# Patient Record
Sex: MALE | Race: BLACK OR AFRICAN AMERICAN | NOT HISPANIC OR LATINO | URBAN - METROPOLITAN AREA
[De-identification: names, ages, dates, MRNs, and addresses within clinical notes are randomized per-mention and may not be internally consistent; named-entity substitution may affect disease eponyms.]

---

## 2017-04-25 ENCOUNTER — EMERGENCY (EMERGENCY)
Facility: HOSPITAL | Age: 57
LOS: 0 days | Discharge: HOME | End: 2017-04-25
Attending: EMERGENCY MEDICINE
Payer: COMMERCIAL

## 2017-04-25 VITALS
RESPIRATION RATE: 18 BRPM | HEIGHT: 65 IN | SYSTOLIC BLOOD PRESSURE: 144 MMHG | HEART RATE: 117 BPM | DIASTOLIC BLOOD PRESSURE: 92 MMHG | OXYGEN SATURATION: 99 % | TEMPERATURE: 99 F | WEIGHT: 175.05 LBS

## 2017-04-25 VITALS
SYSTOLIC BLOOD PRESSURE: 127 MMHG | HEART RATE: 85 BPM | DIASTOLIC BLOOD PRESSURE: 76 MMHG | RESPIRATION RATE: 17 BRPM | OXYGEN SATURATION: 97 % | TEMPERATURE: 99 F

## 2017-04-25 DIAGNOSIS — Z91.013 ALLERGY TO SEAFOOD: ICD-10-CM

## 2017-04-25 DIAGNOSIS — R07.9 CHEST PAIN, UNSPECIFIED: ICD-10-CM

## 2017-04-25 DIAGNOSIS — I10 ESSENTIAL (PRIMARY) HYPERTENSION: ICD-10-CM

## 2017-04-25 DIAGNOSIS — R51 HEADACHE: ICD-10-CM

## 2017-04-25 LAB
ALBUMIN SERPL ELPH-MCNC: 3.8 G/DL — SIGNIFICANT CHANGE UP (ref 3.3–5)
ALP SERPL-CCNC: 96 U/L — SIGNIFICANT CHANGE UP (ref 40–120)
ALT FLD-CCNC: 51 U/L — SIGNIFICANT CHANGE UP (ref 12–78)
ANION GAP SERPL CALC-SCNC: 9 MMOL/L — SIGNIFICANT CHANGE UP (ref 5–17)
APTT BLD: 29.4 SEC — SIGNIFICANT CHANGE UP (ref 27.5–37.4)
AST SERPL-CCNC: 51 U/L — HIGH (ref 15–37)
BASOPHILS # BLD AUTO: 0.1 K/UL — SIGNIFICANT CHANGE UP (ref 0–0.2)
BASOPHILS NFR BLD AUTO: 2.1 % — HIGH (ref 0–2)
BILIRUB SERPL-MCNC: 0.8 MG/DL — SIGNIFICANT CHANGE UP (ref 0.2–1.2)
BUN SERPL-MCNC: 15 MG/DL — SIGNIFICANT CHANGE UP (ref 7–23)
CALCIUM SERPL-MCNC: 8.9 MG/DL — SIGNIFICANT CHANGE UP (ref 8.5–10.1)
CHLORIDE SERPL-SCNC: 102 MMOL/L — SIGNIFICANT CHANGE UP (ref 96–108)
CK MB BLD-MCNC: 0.2 % — SIGNIFICANT CHANGE UP (ref 0–3.5)
CK MB BLD-MCNC: 0.4 % — SIGNIFICANT CHANGE UP (ref 0–3.5)
CK MB CFR SERPL CALC: 0.5 NG/ML — SIGNIFICANT CHANGE UP (ref 0.5–3.6)
CK MB CFR SERPL CALC: 0.6 NG/ML — SIGNIFICANT CHANGE UP (ref 0.5–3.6)
CK SERPL-CCNC: 125 U/L — SIGNIFICANT CHANGE UP (ref 26–308)
CK SERPL-CCNC: 307 U/L — SIGNIFICANT CHANGE UP (ref 26–308)
CO2 SERPL-SCNC: 28 MMOL/L — SIGNIFICANT CHANGE UP (ref 22–31)
CREAT SERPL-MCNC: 1 MG/DL — SIGNIFICANT CHANGE UP (ref 0.5–1.3)
EOSINOPHIL # BLD AUTO: 0.1 K/UL — SIGNIFICANT CHANGE UP (ref 0–0.5)
EOSINOPHIL NFR BLD AUTO: 1.2 % — SIGNIFICANT CHANGE UP (ref 0–6)
GLUCOSE SERPL-MCNC: 109 MG/DL — HIGH (ref 70–99)
HCT VFR BLD CALC: 47.8 % — SIGNIFICANT CHANGE UP (ref 39–50)
HGB BLD-MCNC: 16.8 G/DL — SIGNIFICANT CHANGE UP (ref 13–17)
INR BLD: 0.96 RATIO — SIGNIFICANT CHANGE UP (ref 0.88–1.16)
LYMPHOCYTES # BLD AUTO: 1.5 K/UL — SIGNIFICANT CHANGE UP (ref 1–3.3)
LYMPHOCYTES # BLD AUTO: 28.4 % — SIGNIFICANT CHANGE UP (ref 13–44)
MCHC RBC-ENTMCNC: 30.3 PG — SIGNIFICANT CHANGE UP (ref 27–34)
MCHC RBC-ENTMCNC: 35.2 GM/DL — SIGNIFICANT CHANGE UP (ref 32–36)
MCV RBC AUTO: 86 FL — SIGNIFICANT CHANGE UP (ref 80–100)
MONOCYTES # BLD AUTO: 0.6 K/UL — SIGNIFICANT CHANGE UP (ref 0–0.9)
MONOCYTES NFR BLD AUTO: 10.8 % — SIGNIFICANT CHANGE UP (ref 2–14)
NEUTROPHILS # BLD AUTO: 3 K/UL — SIGNIFICANT CHANGE UP (ref 1.8–7.4)
NEUTROPHILS NFR BLD AUTO: 57.4 % — SIGNIFICANT CHANGE UP (ref 43–77)
PLATELET # BLD AUTO: 188 K/UL — SIGNIFICANT CHANGE UP (ref 150–400)
POTASSIUM SERPL-MCNC: 5.2 MMOL/L — SIGNIFICANT CHANGE UP (ref 3.5–5.3)
POTASSIUM SERPL-SCNC: 5.2 MMOL/L — SIGNIFICANT CHANGE UP (ref 3.5–5.3)
PROT SERPL-MCNC: 8.3 GM/DL — SIGNIFICANT CHANGE UP (ref 6–8.3)
PROTHROM AB SERPL-ACNC: 10.5 SEC — SIGNIFICANT CHANGE UP (ref 9.8–12.7)
RBC # BLD: 5.56 M/UL — SIGNIFICANT CHANGE UP (ref 4.2–5.8)
RBC # FLD: 13.3 % — SIGNIFICANT CHANGE UP (ref 11–15)
SODIUM SERPL-SCNC: 139 MMOL/L — SIGNIFICANT CHANGE UP (ref 135–145)
TROPONIN I SERPL-MCNC: <.015 NG/ML — SIGNIFICANT CHANGE UP (ref 0.01–0.04)
TROPONIN I SERPL-MCNC: <.015 NG/ML — SIGNIFICANT CHANGE UP (ref 0.01–0.04)
WBC # BLD: 5.2 K/UL — SIGNIFICANT CHANGE UP (ref 3.8–10.5)
WBC # FLD AUTO: 5.2 K/UL — SIGNIFICANT CHANGE UP (ref 3.8–10.5)

## 2017-04-25 PROCEDURE — 70450 CT HEAD/BRAIN W/O DYE: CPT | Mod: 26

## 2017-04-25 PROCEDURE — 71010: CPT | Mod: 26

## 2017-04-25 PROCEDURE — 99285 EMERGENCY DEPT VISIT HI MDM: CPT

## 2017-04-25 NOTE — ED ADULT NURSE NOTE - OBJECTIVE STATEMENT
pt c/o head "cramping" pain, spb 190 at home. pt states took nifedipine 20mg po twice today, last dose at 1300 prior to arrival

## 2017-04-25 NOTE — ED PROVIDER NOTE - MEDICAL DECISION MAKING DETAILS
pt pending second set Henry with Dr. Paul to follow up. pt pending second set Henry with Dr. Lamas to follow up.  bruce lamas: pt was a signout pending 2nd trop which is neg. pt appears well. pt appears well and non-toxic. . VSS. Sx have improved during ED stay. No acute events during ED observation period. Precautions given to return to the ED if sx persist or change. Pt expresses understanding and has no further questions. Pt feels comfortable and wishes to be discharged home. Instructed to follow up with PMD cards in 24 hrs.

## 2017-04-25 NOTE — ED PROVIDER NOTE - CARE PLAN
Principal Discharge DX:	Acute intractable headache, unspecified headache type  Secondary Diagnosis:	Chest pain, unspecified type

## 2017-04-25 NOTE — ED PROVIDER NOTE - OBJECTIVE STATEMENT
56 year old male with PMH of HTN presenting to ED due to feeling of head heaviness, states otherwise had some chest discomfort as well earlier, felt tight. No SOB, no cough.

## 2018-12-04 ENCOUNTER — EMERGENCY (EMERGENCY)
Facility: HOSPITAL | Age: 58
LOS: 0 days | Discharge: HOME | End: 2018-12-04
Attending: EMERGENCY MEDICINE
Payer: COMMERCIAL

## 2018-12-04 VITALS
TEMPERATURE: 98 F | HEIGHT: 65 IN | HEART RATE: 63 BPM | OXYGEN SATURATION: 99 % | DIASTOLIC BLOOD PRESSURE: 81 MMHG | SYSTOLIC BLOOD PRESSURE: 160 MMHG | WEIGHT: 179.9 LBS | RESPIRATION RATE: 19 BRPM
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
## 2018-12-04 DIAGNOSIS — S22.41XA MULTIPLE FRACTURES OF RIBS, RIGHT SIDE, INITIAL ENCOUNTER FOR CLOSED FRACTURE: ICD-10-CM

## 2018-12-04 DIAGNOSIS — I10 ESSENTIAL (PRIMARY) HYPERTENSION: ICD-10-CM

## 2018-12-04 DIAGNOSIS — M54.6 PAIN IN THORACIC SPINE: ICD-10-CM

## 2018-12-04 DIAGNOSIS — Z91.013 ALLERGY TO SEAFOOD: ICD-10-CM
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
## 2018-12-04 PROCEDURE — 99284 EMERGENCY DEPT VISIT MOD MDM: CPT

## 2018-12-04 PROCEDURE — 71101 X-RAY EXAM UNILAT RIBS/CHEST: CPT | Mod: 26,RT

## 2018-12-04 RX ORDER — NIFEDIPINE 30 MG
0 TABLET, EXTENDED RELEASE 24 HR ORAL
Qty: 0 | Refills: 0 | COMMUNITY

## 2018-12-04 RX ORDER — OXYCODONE AND ACETAMINOPHEN 5; 325 MG/1; MG/1
1 TABLET ORAL ONCE
Qty: 0 | Refills: 0 | Status: DISCONTINUED | OUTPATIENT
Start: 2018-12-04 | End: 2018-12-04

## 2018-12-04 RX ADMIN — OXYCODONE AND ACETAMINOPHEN 1 TABLET(S): 5; 325 TABLET ORAL at 15:35

## 2018-12-04 NOTE — ED PROVIDER NOTE - MUSCULOSKELETAL, MLM
Spine appears normal, range of motion is not limited, right inferior posterior chest wall mild tenderness

## 2018-12-04 NOTE — ED PROVIDER NOTE - NSFOLLOWUPCLINICS_GEN_ALL_ED_FT
[facility]  Internal Medicine  [phone] [place] [address]  Phone: [phone]  Fax:   Follow Up Time:

## 2018-12-04 NOTE — ED ADULT NURSE NOTE - OBJECTIVE STATEMENT
Patient fell down steps and now complaining of back pain. Patient fell down steps and now complaining of right posterior rib pain. Denies hitting head and denies use of blood thinners.

## 2018-12-04 NOTE — ED ADULT NURSE NOTE - NS ED NURSE DC INFO COMPLEXITY
Simple: Patient demonstrates quick and easy understanding/Verbalized Understanding Verbalized Understanding/Simple: Patient demonstrates quick and easy understanding

## 2018-12-04 NOTE — ED ADULT NURSE NOTE - NSIMPLEMENTINTERV_GEN_ALL_ED
Implemented All Fall Risk Interventions:  [place] to call system. Call bell, personal items and telephone within reach. Instruct patient to call for assistance. Room bathroom lighting operational. Non-slip footwear when patient is off stretcher. Physically safe environment: no spills, clutter or unnecessary equipment. Stretcher in lowest position, wheels locked, appropriate side rails in place. Provide visual cue, wrist band, yellow gown, etc. Monitor gait and stability. Monitor for mental status changes and reorient to person, place, and time. Review medications for side effects contributing to fall risk. Reinforce activity limits and safety measures with patient and family.

## 2021-02-19 NOTE — ED ADULT NURSE NOTE - NSSISCREENINGQ3_ED_A_ED
70 y.o. OTHER male who presents for RPE    He has not gone back to the gym since the pandemic started; he is walking and biking and doing light weights at home. Sees Dr Cedrick Powers, bp controlled. He continues to see Dr J Carlos Esqueda and the abilify is controlling sx    He continues to see Dr Bossman Raman and had bx done yesterday for elev psa, results pending. He went to [facility] and no help. He tried ICI but had penile trauma, minimal help with JOVANNA. He is asking for daily Shijiebang script to go to [facility])    The neuropathy is about the same    He saw Dr German Waldron recently and he's considering [facility] Laser therapy     LAST MEDICARE WELLNESS EXAM: never as not medicare b    Past Medical History:   Diagnosis Date    Allergic rhinitis     Dr Pascale Michelle; never took immunotherapy    Anxiety     saw Dr Gurvinder Michelle; pfts show no obstruction but high RV    Basal cell carcinoma 2013    Dr. Irene Valentin s/p resection 2 spots    BPH (benign prostatic hyperplasia)     Dr. Dalton Patterson; on proscar for years    Depression     and anxiety; paxil 2008?  lexapro and wellbutrin til 2018; tried proza; saw Dr Rowe Phalen now seeing Dr Chata Watts Dyslipidemia     Elevated PSA 2020    Dr Pratibha Piper Erectile dysfunction     ICI with penile trauma; JOVANNA not helpful; [facility] unsuccessful    FHx: heart disease     GERD (gastroesophageal reflux disease)     s/p dilation 2003 Dr. Miah Landrum; egd 2015; Dr Miah Landrum egd 12/20 dilation, hiatal hernia    Heel spur     Dr Amie Merchant Hypertension 02/2019    24 hr ambulatory monitoring Dr Nathanael Franklin Hypovitaminosis D     GALILEA (obstructive sleep apnea)     intol cpap Dr Estefana Primrose 2015; using oral appliance Dr Vane Cheung; AHI 17.8 min desats 83    Overweight (BMI 25.0-29.9) 2/12/2018    Peripheral neuropathy     and B CTS on EMG Dr. Yadira Shook Pulmonary nodules 2011    bilat upper lobes; no change 2018; noted again [facility] 6/19    Scoliosis     Dr Aman Pruett 2014    Spinal stenosis 03/2014    MRI (3/14) showed scoliosis w multilevel degen findings, mod L3-4, L4-5 central stenosis, mod L5-S1 foraminal stenosis;  (10/17) severe L3-4 central stenosis; seeing Dr Krhis Eid     Past Surgical History:   Procedure Laterality Date    HX COLONOSCOPY      Dr. Zachariah Blanca mild diverticulosis 2000, 6/12    HX GI  06/2019    CT abd/pelvis showed nothing acute, small LIH, small HH, bladder divertic    HX ORTHOPAEDIC      B CT release 2007, 2009    HX ORTHOPAEDIC      DEXA t score 4.2 spine, 0.2 hip (2012)    AL CARDIAC SURG PROCEDURE UNLIST  8/09    ETT negative (8/09); SOH neg stress echo (6/19)    AL CARDIAC SURG PROCEDURE UNLIST  09/2019    echo nl lv, ef 55%, no wma or valvular disease    AL CHEST SURGERY PROCEDURE UNLISTED  07/2018    Dr Mark Crockett; FEV1 96%, 5% improvement postbd, ratio 104, , , DLCO 82     Social History     Socioeconomic History    Marital status:      Spouse name: Not on file    Number of children: 2    Years of education: Not on file    Highest education level: Not on file   Occupational History    Occupation: ret [employer]   Social Needs    Financial resource strain: Not on file    Food insecurity     Worry: Not on file     Inability: Not on file   [employer] needs     Medical: Not on file     Non-medical: Not on file   Tobacco Use    Smoking status: Former Smoker     Packs/day: 0.25     Years: 2.00     Pack years: 0.50    Smokeless tobacco: Never Used    Tobacco comment: reports occasional use as a teenager   Substance and Sexual Activity    Alcohol use:  Yes     Alcohol/week: 1.0 standard drinks     Types: 1 Glasses of wine per week     Comment: 1-2 glasses of wine a month    Drug use: No    Sexual activity: Yes     Partners: Female     Birth control/protection: None   Lifestyle    Physical activity     Days per week: Not on file     Minutes per session: Not on file    Stress: Not on file   Relationships    Social connections Talks on phone: Not on file     Gets together: Not on file     Attends Yazidism service: Not on file     Active member of club or organization: Not on file     Attends meetings of clubs or organizations: Not on file     Relationship status: Not on file    Intimate partner violence     Fear of current or ex partner: Not on file     Emotionally abused: Not on file     Physically abused: Not on file     Forced sexual activity: Not on file   Other Topics Concern    Not on file   Social History Narrative    Not on file     Family History   Problem Relation Age of Onset    Cancer Mother         leukemia    Heart Disease Father     Psychiatric Disorder Daughter         depression     Immunization History   Administered Date(s) Administered    Covid-19, MODERNA, Mrna, Lnp-s, Pf, 100mcg/0.5mL 02/10/2021    Influenza High Dose Vaccine PF 11/17/2017    Influenza Vaccine Rowl) PF (>6 Mo Flulaval, Fluarix, and >3 Yrs Afluria, Fluzone 23934) 11/02/2015, 11/11/2016    Influenza Vaccine (Tri) Adjuvanted (>65 Yrs FLUAD TRI 89087) 10/26/2018, 10/25/2019, 11/01/2020    Influenza Vaccine PF 11/05/2013, 10/30/2014    Influenza Vaccine Split 10/21/2011, 10/31/2012    Influenza Vaccine Whole 11/05/2010    Pneumococcal Conjugate (PCV-13) 01/31/2017    Pneumococcal Polysaccharide (PPSV-23) 08/07/2013, 02/18/2019    TD Vaccine 07/22/2009    Tdap 11/11/2016    Zoster 05/07/2012    Zoster Recombinant 02/19/2020, 09/04/2020     Current Outpatient Medications   Medication Sig    acetaminophen (TylenoL) 325 mg tablet Take 325 mg by mouth every four (4) hours as needed for Pain.  tadalafiL (CIALIS) 5 mg tablet Take 1 Tab by mouth daily.  sildenafil citrate (Viagra) 100 mg tablet Take 1 Tab by mouth daily as needed for Erectile Dysfunction for up to 10 doses.     Combigan 0.2-0.5 % drop ophthalmic solution INSTILL 1 DROP INTO RIGHT EYE TWICE DAILY    rosuvastatin (CRESTOR) 10 mg tablet TAKE 1 TABLET BY MOUTH EVERY NIGHT  gabapentin (NEURONTIN) 600 mg tablet TAKE 1 TABLET BY MOUTH THREE TIMES DAILY FOR NEUROPATIC PAIN. MAXIMUM DAILY AMOUNT OF 1800 MG    Syringe with Needle, Disp, (Allergy Syringe) 1 mL 27 x 1/2\" syrg FOR USE WITH INTRACAVERNOSAL INJECTIONS.  Injector Device maría DISPENSE INJECT-EASE AUTO-INJECTOR FOR USE WITH BI-MIX INJECTIONS.  fluticasone propionate (FLONASE) 50 mcg/actuation nasal spray SHAKE LIQUID AND USE 2 SPRAYS IN EACH NOSTRIL DAILY    triamterene-hydroCHLOROthiazide (DYAZIDE) 37.5-25 mg per capsule TAKE 1 CAPSULE BY MOUTH DAILY    metoprolol tartrate (LOPRESSOR) 25 mg tablet TAKE 1 TABLET BY MOUTH TWICE DAILY    ARIPiprazole (ABILIFY) 2 mg tablet Take 2 mg by mouth daily.  SYMBICORT 160-4.5 mcg/actuation HFAA INHALE 2 PUFFS BY MOUTH TWICE DAILY    ALPRAZolam (XANAX) 0.25 mg tablet Take 1 Tab by mouth daily.  VIIBRYD 40 mg tab tablet Take 1 Tab by mouth daily.  buPROPion SR (WELLBUTRIN SR) 150 mg SR tablet Take 1 Tab by mouth two (2) times a day. (Patient taking differently: Take 300 mg by mouth daily.)    latanoprost (XALATAN) 0.005 % ophthalmic solution MOAR 1 GTT INTO RIGHT EYE HS    albuterol (PROAIR HFA) 90 mcg/actuation inhaler Take 2 Puffs by inhalation every four (4) hours as needed for Wheezing.  ascorbic acid (VITAMIN C) 500 mg tablet Take  by mouth.  aspirin delayed-release 81 mg tablet Take  by mouth daily.  cholecalciferol, vitamin d3, (VITAMIN D3) 1,000 unit tablet Take  by mouth daily.  Gluc-Scar-MSM#5-C-Ofvd-Mike-Bor (OSTEO BI-FLEX) 750-625-30 mg Tab Take  by mouth daily. No current facility-administered medications for this visit.       Allergies   Allergen Reactions    Cefdinir Nausea and Vomiting    Naproxen Other (comments)     elev cr 2/19    Niacin Other (comments)     Flushing      Norvasc [Amlodipine] Swelling     REVIEW OF SYSTEMS: colo 6/12 Dr Omega Olmstead  Ophtho - no vision change or eye pain  Oral - no mouth pain, tongue or tooth problems  Ears - no hearing loss, ear pain, fullness, no swallowing problems  Cardiac - no CP, PND, orthopnea, edema, palpitations or syncope  Chest - no breast masses  Resp - no wheezing, chronic coughing, dyspnea  GI - no heartburn, nausea, vomiting, change in bowel habits, bleeding, hemorrhoids  Urinary - no dysuria, hematuria, flank pain, urgency, frequency    Visit Vitals  /72 (BP 1 Location: Left arm, BP Patient Position: Sitting, BP Cuff Size: Adult)   Pulse 62   Temp 97.3 °F (36.3 °C) (Temporal)   Resp 14   Ht 6' 3\" (1.905 m)   Wt 187 lb (84.8 kg)   SpO2 100%   BMI 23.37 kg/m²   A&O x3  Affect is appropriate. Mood stable  No apparent distress  Anicteric, no JVD, adenopathy or thyromegaly. No carotid bruits or radiated murmur  Lungs clear to auscultation, no wheezes or rales  Heart showed regular rate and rhythm. No murmur, rubs, gallops  Abdomen soft nontender, no hepatosplenomegaly or masses. Extremities without edema.   Pulses 1-2+ symmetrically    LABS  From 11/10 showed gluc 91, cr 1.10,             alt 25, chol 146, tg 74,   hdl 26, ldl-c 95, wbc 6.2, hb 14.3, plt 191, psa 0.60  From 4/12 showed                           alt 26, chol 138, tg 105, hdl 42, ldl-c 75  From 5/12 showed                                      vit d 57.9  From 7/13 showed                 alt 25, chol 135, tg 70,   hdl 44, ldl-c 77  From 7/13 showed   gluc 88, cr 1.02, gfr 79,  alt 10,                wbc 5.0, hb 14.3, plt 182, psa 0.60  From 9/14 showed   gluc 95, cr 0.99, gfr>60, alt 13, chol 138, tg 81,   hdl 41, ldl-c 81, wbc 5.6, hb 13.9, plt 187, psa 1.00  From 11/15 showed gluc 84, cr 1.01, gfr>60, alt 12, chol 148, tg 72,   hdl 44, ldl-c 90, wbc 4.8, hb 14.9, plt 193  From 1/17 showed   gluc 85, cr 1.05, gfr>60, alt 36, chol 144, tg 69,   hdl 56, ldl-c 74, wbc 4.9, hb 14.7, plt 212, psa 1.60, hba1c 5.7, vit d 44.2, hep c neg  From 2/18 showed   gluc 89, cr 1.04, gfr>60, alt 35, chol 142, tg 64,   hdl 53, ldl-c 76, wbc 4.5, hb 14.5, plt 208, psa 2.30  From 1/19 showed                           tsh 1.10,     ft4 0.80, b12 1006, fol>20, b6 nl, lorraine neg, ferritin 58  From 1/20 showed                        psa 4.60,    test 271  From 2/20 showed   gluc 85, cr 1.26, gfr 57, alt 32,  chol 158, tg 179, hdl 47, ldl-c 75, wbc 5.6, hb 15.0, plt 189  From 2/21 showed   gluc 87, cr 1.03, gfr 58, alt 27,  chol 150, tg 121, hdl 46, ldl-c 80, wbc 5.0, hb 14.7, plt 196, psa 5.16, test 329    We reviewed the patient's labs from the last several visits to point out trends in the numbers          Patient Active Problem List   Diagnosis Code    Dyslipidemia E78.5    Hypovitaminosis D E55.9    BPH (benign prostatic hyperplasia) Dr. Cruz Avery N40.0    Neuropathy Dr. Arabella Martinez G62.9    Erectile dysfunction N52.9    Diverticulosis of large intestine without hemorrhage Dr Slick Urbina K57.30    GALILEA (obstructive sleep apnea) Dr Arabella Martinez using oral appliance AHI 17.8 G47.33    Spinal stenosis, lumbar region with neurogenic claudication M48.062    Overweight (BMI 25.0-29. 9) E66.3    MDD (major depressive disorder), recurrent episode, mild (HCC) F33.0    Essential hypertension I10    Elevated PSA R97.20    GERD (gastroesophageal reflux disease) K21.9    Depression F32.9    Asthma J45.909    Anxiety F41.9     Assessment and plan:  1. Elevated psa, BPH. Per Dr Rosibel Winter  2. Dyslipidemia. Continue current regimen. 3.  Allergic rhinitis. Continue current  4. GERD. Continue current. 5.  ED.  cialis daily  6. HTN. Continue current regimen. 7.  Depression, anxiety, OCD. Continue current and f/u Dr Nolberto Umanzor. 8.  Hypovit D. Follow   9. GALILEA. Per neurology  10. Spinal stenosis. Continue current  11. shingrix advocated        RTC 2/21    Above conditions discussed at length and patient vocalized understanding.   All questions answered to patient satisfaction No

## 2021-06-20 NOTE — ED ADULT NURSE NOTE - MODE OF DISCHARGE
Letter by Estephania Espinosa NP at      Author: Estephania Espinosa NP Service: -- Author Type: --    Filed:  Encounter Date: 6/3/2020 Status: (Other)         Patient: Sonu Solomon   MR Number: [id number]   YOB: 1928   Date of Visit: 6/3/2020                 [facility]    DATE: 6/3/2020    NAME:  Sonu Solomon             :  1928  MRN: [id number]  CODE STATUS:  DNR/DNI    VISIT TYPE: Problem Visit (fall, weakness, weight loss)     FACILITY:  [facility] [[id number]]       CHIEF COMPLAIN/REASON FOR VISIT:    Chief Complaint   Patient presents with   ? Problem Visit     fall, weakness, weight loss               HISTORY OF PRESENT ILLNESS: Sonu Solomon is a 89 y.o. male who is a resident of [facility]. He has PMH of HTN, chronic DVAID infection, severe mitral valve prolapse, regurgitation, hyponatremia, aortic stenosis.     Today Mr. Solomon is seen for concerns of fall this morning with left facial abrasions. He was reportedly trying to pick something up and fell forward but it was unwitnessed as he was alone in his apartment. He has had further weight loss and most recent weight was 90lbs. He continues to have poor intake but staff is not exactly sure what or how much he is eating since he is alone and independent for eating. He continues to have weakness and further decline. He was previously evaluated by hospice but family chose to continue with therapy at this time. He is seen today alone in his apartment. He is very pleasantly confused but mental status appears at baseline for him. He reports that he is not having pain in his head where he hit his head. He was not aware he had several abrasions and rug burns on his face. He thinks he is fine. He says he just tripped over forwards today. He was trying to water flowers by the AC unit and had his cane and thinks he tripped on his cane. Noted nursing had applied steri strips to his laceration. He  says he bumped his head the other day on the kitchen cabinet because it was open and he was bending over and stood up and bumped it. He is not sure if he still has a bump but says it does not hurt anymore. He thinks it might have bled some but not sure. He denies falling recently  Other than today. He says he eats well and his appetite is good. He does not feel he is losing weight. He denies any bowel concerns or stomach upset. He says he sleeps well. He sleeps about 7 hours and gets up to pee in the urinal maybe a couple times. He does admit to feeling confused recently. He thinks his bottom is fine and that he changes the dressing every so often. He sits on his cushions to help as well. He shows these to me. He says he has a dry cough at times but no other symptoms. He wants to know when the activities are going to start up again because he misses going to [facility] and seeing the other residents. Per nursing examined his buttocks region today and his pressure injury is improving. He is not changing the bandages like he tells people he is. He has been refusing to bathe or change his clothes. Today on exam I pointed out to him how dirty and stained his clothes were and he says he will change them tonight when he goes to bed. Called daughter Aparna Nayak while on unit to discuss fall and further decline. We reviewed his weights and weight loss and discussed that it is difficult to determine what he is eating since he is alone. We discussed that he has not made much progress with therapy and she says she received an email on Monday from Flor in PT that he was weaker and off balance. Tuesday he was unable to do therapy due to being too tired. She has been discussing with the director here whether he needs higher level of care. She is no longer comfortable with him being in his apartment alone. She wants to know if he should go to the transitional care unit. We discussed that he is not showing progress in his current therapy  and this is not due to acute process but rather a chronic and progressive decline. We discussed that he should have increased services but that he remains rather independent for many ADLs. We discussed with adding hospice services he would have more individuals checking in on him and providing services. We also discussed options for hiring private caregivers if needed as well. She asked whether he would decline further if removed from his apartment and I explained from my observations he struggled with the transition from home to tcu next door and then to assisted living but then became settled and feels this is home. I explained how he was asking about sidney and the other residents and that he would be happier if remained in his apartment with hospice and additional services. She agreed and said that was what she was wondering. I did update her on his appearance and injuries from the fall. She says she thinks he has refused bathing since the lockdown started. She appreciated call and discussion and is going to let hospice know they are ready for them to admit him.     REVIEW OF SYSTEMS:  PROBLEMS AND REVIEW OF SYSTEMS:   Today on ROS:   Currently, no fever, chills, or rigors. Decreased vision, hearing intact. Denies any chest pain, headaches, palpitations, lightheadedness, dizziness, shortness of breath.  Positive for forgetfulness, urinary frequency, nocturia, incontinence at times but controlled, pain in bottom intermittent, pain in right arm intermittent, confusion, weight loss, poor hygiene, poor intake, fall today, left forehead abrasions, weakness, unable to obtain further ROS due to cognition    Allergies   Allergen Reactions   ? Hydrocodone-Acetaminophen Nausea Only   ? Tree And Shrub Pollen Other (See Comments)     Sneezing from evergreen tree pollen in winter.     Current Outpatient Medications   Medication Sig   ? acetaminophen (TYLENOL) 500 MG tablet Take 1,000 mg by mouth 3 (three) times a day.   ?  carvediloL (COREG) 3.125 MG tablet 1 TAB BY MOUTH TWICE DAILY   ? cyanocobalamin (VITAMIN B-12) 500 MCG tablet Take 500 mcg by mouth daily.   ? oxybutynin (DITROPAN XL) 10 MG ER tablet Take 10 mg by mouth at bedtime.    ? pumpkin seed extract/soy germ (AZO BLADDER CONTROL ORAL) Take 1 capsule by mouth at bedtime.     Past Medical History:    Past Medical History:   Diagnosis Date   ? Arrhythmia     PAC's   ? Hypertension    ? Valvular disease     MVP with mod-severe MR           PHYSICAL EXAMINATION  Vitals:    06/03/20 0700   BP: 106/73   Pulse: 100   Resp: 18   Temp: 97.9  F (36.6  C)   SpO2: 92%   Weight: (!) 90 lb (40.8 kg)       Today on physical exam:     GENERAL: Awake, alert, follows simple commands,  small frame, very cachectic today, unkempt, poor hygiene, stained clothes, calm, cooperative  HEENT: Head is normocephalic with normal hair distribution. No evidence of trauma. Ears: No acute purulent discharge. Eyes: Conjunctivae pink with no scleral jaundice. Nose: Normal mucosa and septum. NECK: Supple with no cervical or supraclavicular lymphadenopathy. Trachea is midline. [place], glasses, left eyebrow abrasion with steri strips, left face and forehead denuded areas (rug burns)  CHEST: No tenderness or deformity, no crepitus  LUNG: dim to auscultation with good chest expansion. There are no crackles or wheezes, normal AP diameter.  BACK: mild kyphosis of the thoracic spine. Symmetric, no curvature, ROM normal, no CVA tenderness, no spinal tenderness   CVS: There is good S1  S2, regular rhythm, murmur present,  2+ pulses symmetric in all extremities.  ABDOMEN: concave and soft, nontender to palpation, non distended, no masses, no organomegaly, good bowel sounds, no rebound or guarding, no peritoneal signs.   EXTREMITIES: No pedal edema, Atraumatic. Full range of motion on both upper and lower extremities, there is no tenderness to palpation, no cyanosis or clubbing, no calf tenderness.  Pulses equal in all  extremities, normal cap refill, no joint swelling.   SKIN: Warm and dry, no erythema noted.  Examined coccyx region not examined today, left hand two small skin tears  NEUROLOGICAL: confused, forgetful. Repeats self at times. Speech clear, no facial droop, no aphasia noted, no arm drift or unilateral weakness, cognitive decline but calm, cooperative today          LABS:   No results found for this or any previous visit (from the past 168 hour(s)).  Results for orders placed or performed during the hospital encounter of 01/30/20   Basic Metabolic Panel   Result Value Ref Range    Sodium 143 136 - 145 mmol/L    Potassium 4.6 3.5 - 5.0 mmol/L    Chloride 105 98 - 107 mmol/L    CO2 26 22 - 31 mmol/L    Anion Gap, Calculation 12 5 - 18 mmol/L    Glucose 128 (H) 70 - 125 mg/dL    Calcium 9.4 8.5 - 10.5 mg/dL    BUN 36 (H) 8 - 28 mg/dL    Creatinine 1.07 0.70 - 1.30 mg/dL    GFR MDRD Af Amer >60 >60 mL/min/1.73m2    GFR MDRD Non Af Amer >60 >60 mL/min/1.73m2         Lab Results   Component Value Date    WBC 6.7 01/30/2020    HGB 13.5 (L) 01/30/2020    HCT 43.0 01/30/2020    MCV 93 01/30/2020     01/30/2020       Lab Results   Component Value Date    [id number] 657 07/19/2019     Lab Results   Component Value Date    HGBA1C 6.1 01/30/2020     Lab Results   Component Value Date    INR 1.05 03/13/2018     No results found for: [id number]  Lab Results   Component Value Date    TSH 2.04 05/28/2015           ASSESSMENT/PLAN:    Fall, Left forehead laceration, facial abrasions and friction burns, left hand skin tears: steri strips, bacitracin were applied. Fall this am while watering flowers near the AC unit and tripped on cane. Appears mechanical. Continues to be weak and physically declining despite therapy. Discussed hospice again today. Need to increase services, possibly add private caregivers depending how he does with hospice services. Discussed with nursing more frequent checks and fall prevention management.    Severe protein-calorie malnutrition, weight loss: most recent documented weights [phone].  Encourage protein shakes multiple times per day. Unclear how often he is drinking these. Unclear how much he is eating as continues to lose weight and is independent for feeding. Lost 18lbs over last several months. appropriate for hospice at this time.   Severe cognitive decline, Alzheimer's dementia, self neglect: concern for self neglect, poor hygiene, unkempt appearance. Dirty clothing, refusing showers, forgetting he has not performed ADLs and refusing for staff to assist with these. Discussed increasing services again today with daughter. Appropriate for hospice at this time.   TIAs: intermittent slurred speech, no aphasia, facial droop noted, no unilateral weakness. Increased confusion at times. Previously decided on conservative treatments per discussion with daughter. Hospice consult.   Stage 1 Pressure injury of coccyx: continue to use pressure offloading cushions for chairs and walker, suspect not remembering to use these. Foam dressing to coccyx but unclear how often being changed. Nursing reports changing this today and improved appearance. He tells staff he is changing the dressing but he appears to not be.   Chronic bronchiectasis, chronic DAVID: no recent exacerbations or concerns.    HTN: SBP 100s. On coreg. Stable today.   Aortic stenosis, severe mitral valve prolapse, regurgitation: On coreg. No recent complaints of chest pain, shortness of breath, but poor historian and lives alone.   Overactive bladder: On oxybutynin. Unclear how stable this is as he is poor historian. Stained underwear.   Vitamin b 12 deficiency: On vitamin b12.  Hospice consult, failure to thrive, malnutrition, end stage Dementia: severe cognitive decline, self neglect, weight loss, malnutrition, weakness, physical decline. Requiring more assist with ADLs and functioning. Fall today. Further weight loss 108-90lbs over last few  months. Discussed with daughter today and very appropriate for hospice at this time. Updated hospice team.     Electronically signed by: Estephania Espinosa NP       Total floor/unit time spent 62 min with >50% min spent on counseling and coordination of care. Counseling regarding malnutrition, fall management. Coordinated care with nursing and hospice for hospice admission, malnutrition, physical decline, cognitive decline, fall prevention management.             Ambulatory

## 2024-02-03 NOTE — ED ADULT NURSE NOTE - CAS ELECT INFOMATION PROVIDED
cc:  abscess, infected cyst lower back  HPI:  cyst getting redder and larger for four days, on Bactrim for four days
DC instructions

## 2024-06-07 NOTE — ED ADULT NURSE NOTE - NS ED NOTE ABUSE RESPONSE YN
no PROVIDER:[TOKEN:[[id number]:[id number]],FOLLOWUP:[4-6 Days],ESTABLISHEDPATIENT:[T]]